# Patient Record
(demographics unavailable — no encounter records)

---

## 2025-04-14 NOTE — HISTORY OF PRESENT ILLNESS
[de-identified] : 04/07/2025: The patient is a 79 year old M, right hand dominant who presents today complaining of right shoulder. Interrupts him QHS when he lies onto his right side. Date of Injury/Onset: 2/2025 Pain:    At Rest: 0/10 With Activity:  8/10 Mechanism of injury: gradual onset This is not a Work Related Injury being treated under Worker's Compensation. This is not an athletic injury occurring associated with an interscholastic or organized sports team. Quality of symptoms: lateral shoulder and upper arm pain, radiating into the lateral upper arm, mild parasthesia in the rt hand (seeing spine) Improves with: rest Worse with: OH movement, sleeping, reaching behind, getting dressed, his wife helps him put his jacket on. Prior treatment: None Prior imaging: None Previous injury: None Out of work/sport: N/A School/Sport/Position/Occupation: retired  Additional Information: Seeing PM (Dr. Knight) for spine (pmhx L-spine - exstop 2009, laminectomy 2015), GERD

## 2025-04-14 NOTE — DISCUSSION/SUMMARY
[de-identified] :  RIGHT shoulder X-ray examination of  (3-4 views): no fractures or dislocations;  AC joint arthrosis; Type 2 acromion; subacromial spur; joint space narrowing; inferior humeral head osteophyte     Assessment: The patient is a 79 year old male with physical exam findings consistent with *** RIGHT *** SHOULDER OSTEOARTHRITIS/ MILD ROTATOR CUFF ARTHROPATHY     The natural progression of osteoarthritis was explained to the patient.  We discussed the possible treatment options from conservative to operative.  ***He CANNOT TAKE NSAIDS*** on Eliquis. Physical Therapy as well different types of injections.  We also discussed that at some point they may progress to needing a TSA.  Information and pamphlets were given.     We discussed their diagnosis and treatment options at length including surgical and non-surgical options. We will first attempt conservative treatment with activity modification, PT, icing, weight loss, and anti-inflammatory medications. We discussed the possible of injections (steroid and Visco supplementation) in the future. The patient was provided with a PT prescription to work on ROM, RTC strengthening, shoulder stretches and strengthening, and other exercises on the Shoulder Arthritis Protocol.     Dx / Natural History: The patient was advised of the diagnosis.  The natural history of the pathology was explained in full to the patient in layman's terms.  Several different treatment options were discussed and explained in full to the patient including the risks and benefits of both surgical and non-surgical treatments.  All questions and concerns were answered.    Pain Guide Activities: The patient was advised to let pain guide the gradual advancement of activities.    Physical Therapy: The patient was provided with a prescription for Physical Therapy.   Icing: The patient was advised to apply ice (wrapped in a towel or protective covering) to the area daily (20 minutes at a time, 2-4X/day).  Lidocaine patches sent. Defers CSI today. [employer] shoulder conditioning handout provided.    Follow up for CSI, if PT does not help.

## 2025-04-14 NOTE — IMAGING
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 [Glenohumeral arthritis] : Glenohumeral arthritis [de-identified] : RIGHT / LEFT Shoulder  Inspection: Scapula Winging: Negative Deformity: None Erythema: None Ecchymosis: None Abrasions: None Effusion: Mild Crepitus: Mild   Range of Motion: Active Forward Flexion:140 degrees Passive Forward Flexion:130 degrees Active IR : back pocket Active ER :30 degrees   Motor Exam: Forward Flexion: 4+ out of 5 with discomfort Flexion Plane of Scapula: 5 out of 5 Abduction: 4+ out of 5 Internal Rotation: 5 out of 5 External Rotation: 4+ out of 5 Distal Motor Strength: 5 out of 5   Stability Testing: Anterior: 1+ Posterior: 1+ Sulcus N: 1+ Sulcus ER: 1+   Provocative Tests: Drop Arm: Negative Acevedo/Impingement: Positive [place]: Positive X-Arm Adduction: Negative Belly Press: Negative Bear Hug: Negative Lift Off: Negative Apprehension: Negative Relocation: Negative Posterior Load & Shift: Negative   Palpation: Anterior and Greater tuberosity tenderness Lateral tenderness AC Joint: Nontender Clavicle: Nontender SC Joint: Nontender Bicipital Groove: Positive Coracoid Process: Positive Pectoralis Minor Tendon: Nontender Pectoralis Major Tendon: Nontender & palpably intact Latissimus Dorsi: Nontender Proximal Humerus: Positive Scapula Body: Nontender Medial Scapula Boarder: Nontender Scapula Spine: Nontender   Neurologic Exam: Sensation to Light Touch: Axillary: Grossly intact Ulnar: Grossly intact Radial: Grossly intact Median: Grossly intact Other:  N/A   Circulatory/Pulses: Ulnar: 2+ Radial: 2+ Other Pertinent Findings: None

## 2025-04-14 NOTE — HISTORY OF PRESENT ILLNESS
[de-identified] : 04/07/2025: The patient is a 79 year old M, right hand dominant who presents today complaining of right shoulder. Interrupts him QHS when he lies onto his right side. Date of Injury/Onset: 2/2025 Pain:    At Rest: 0/10 With Activity:  8/10 Mechanism of injury: gradual onset This is not a Work Related Injury being treated under Worker's Compensation. This is not an athletic injury occurring associated with an interscholastic or organized sports team. Quality of symptoms: lateral shoulder and upper arm pain, radiating into the lateral upper arm, mild parasthesia in the rt hand (seeing spine) Improves with: rest Worse with: OH movement, sleeping, reaching behind, getting dressed, his wife helps him put his jacket on. Prior treatment: None Prior imaging: None Previous injury: None Out of work/sport: N/A School/Sport/Position/Occupation: retired  Additional Information: Seeing PM (Dr. Knight) for spine (pmhx L-spine - exstop 2009, laminectomy 2015), GERD

## 2025-04-14 NOTE — IMAGING
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 [Glenohumeral arthritis] : Glenohumeral arthritis [de-identified] : RIGHT / LEFT Shoulder  Inspection: Scapula Winging: Negative Deformity: None Erythema: None Ecchymosis: None Abrasions: None Effusion: Mild Crepitus: Mild   Range of Motion: Active Forward Flexion:140 degrees Passive Forward Flexion:130 degrees Active IR : back pocket Active ER :30 degrees   Motor Exam: Forward Flexion: 4+ out of 5 with discomfort Flexion Plane of Scapula: 5 out of 5 Abduction: 4+ out of 5 Internal Rotation: 5 out of 5 External Rotation: 4+ out of 5 Distal Motor Strength: 5 out of 5   Stability Testing: Anterior: 1+ Posterior: 1+ Sulcus N: 1+ Sulcus ER: 1+   Provocative Tests: Drop Arm: Negative Acevedo/Impingement: Positive [place]: Positive X-Arm Adduction: Negative Belly Press: Negative Bear Hug: Negative Lift Off: Negative Apprehension: Negative Relocation: Negative Posterior Load & Shift: Negative   Palpation: Anterior and Greater tuberosity tenderness Lateral tenderness AC Joint: Nontender Clavicle: Nontender SC Joint: Nontender Bicipital Groove: Positive Coracoid Process: Positive Pectoralis Minor Tendon: Nontender Pectoralis Major Tendon: Nontender & palpably intact Latissimus Dorsi: Nontender Proximal Humerus: Positive Scapula Body: Nontender Medial Scapula Boarder: Nontender Scapula Spine: Nontender   Neurologic Exam: Sensation to Light Touch: Axillary: Grossly intact Ulnar: Grossly intact Radial: Grossly intact Median: Grossly intact Other:  N/A   Circulatory/Pulses: Ulnar: 2+ Radial: 2+ Other Pertinent Findings: None